# Patient Record
(demographics unavailable — no encounter records)

---

## 2024-10-24 NOTE — HISTORY OF PRESENT ILLNESS
[FreeTextEntry1] : I had the pleasure of seeing GENOVEVA in the cardiology office for follow-up. 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 The baby has been thriving at home, has been feeding without difficulty, and has been gaining weight and developing appropriately. There has been no tachypnea, increased work of breathing, cyanosis, diaphoresis, unexplained irritability, or syncope.

## 2024-10-24 NOTE — REVIEW OF SYSTEMS
[Nl] : no feeding issues at this time. [] :  [Acting Fussy] : not acting ~L fussy [Fever] : no fever [Wgt Loss (___ Lbs)] : no recent weight loss [Pallor] : not pale [Discharge] : no discharge [Redness] : no redness [Nasal Discharge] : no nasal discharge [Nasal Stuffiness] : no nasal congestion [Stridor] : no stridor [Cyanosis] : no cyanosis [Edema] : no edema [Diaphoresis] : not diaphoretic [Tachypnea] : not tachypneic [Wheezing] : no wheezing [Cough] : no cough [Being A Poor Eater] : not a poor eater [Vomiting] : no vomiting [Diarrhea] : no diarrhea [Decrease In Appetite] : appetite not decreased [Fainting (Syncope)] : no fainting [Dec Consciousness] :  no decrease in consciousness [Seizure] : no seizures [Hypotonicity (Flaccid)] : not hypotonic [Refusal to Bear Wgt] : normal weight bearing [Puffy Hands/Feet] : no hand/feet puffiness [Rash] : no rash [Hemangioma] : no hemangioma [Jaundice] : no jaundice [Wound problems] : no wound problems [Bruising] : no tendency for easy bruising [Swollen Glands] : no lymphadenopathy [Enlarged [place]] : the fontanelle was not enlarged [Hoarse Cry] : no hoarse cry [Failure To Thrive] : no failure to thrive [Vaginal Discharge] : no vaginal discharge [Ambiguous Genitals] : genitals not ambiguous [Dec Urine Output] : no oliguria [Solid Foods] : No solid food at this time [FreeTextEntry3] : on demand

## 2024-10-24 NOTE — CARDIOLOGY SUMMARY
[Today's Date] : [unfilled] [LVSF ___%] : LV Shortening Fraction [unfilled]% [FreeTextEntry1] : For Fetal follow up Normal sinus rhythm, normal QRS axis, normal intervals (QTc 447 msec), no hypertrophy, no pre-excitation, no ST segment or T wave abnormalities. Normal EKG for age.  [FreeTextEntry2] : PFO normal for age. Left PPS. 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 LV dimensions and shortening fraction were normal.  No pericardial effusion.

## 2024-10-24 NOTE — REVIEW OF SYSTEMS
[Nl] : no feeding issues at this time. [] :  [Acting Fussy] : not acting ~L fussy [Fever] : no fever [Wgt Loss (___ Lbs)] : no recent weight loss [Pallor] : not pale [Discharge] : no discharge [Redness] : no redness [Nasal Discharge] : no nasal discharge [Nasal Stuffiness] : no nasal congestion [Stridor] : no stridor [Cyanosis] : no cyanosis [Edema] : no edema [Diaphoresis] : not diaphoretic [Tachypnea] : not tachypneic [Wheezing] : no wheezing [Cough] : no cough [Being A Poor Eater] : not a poor eater [Vomiting] : no vomiting [Diarrhea] : no diarrhea [Decrease In Appetite] : appetite not decreased [Fainting (Syncope)] : no fainting [Dec Consciousness] :  no decrease in consciousness [Seizure] : no seizures [Hypotonicity (Flaccid)] : not hypotonic [Refusal to Bear Wgt] : normal weight bearing [Puffy Hands/Feet] : no hand/feet puffiness [Rash] : no rash [Hemangioma] : no hemangioma [Jaundice] : no jaundice [Wound problems] : no wound problems [Bruising] : no tendency for easy bruising [Swollen Glands] : no lymphadenopathy [Enlarged [place]] : the fontanelle was not enlarged [Hoarse Cry] : no hoarse cry [Failure To Thrive] : no failure to thrive [Vaginal Discharge] : no vaginal discharge [Ambiguous Genitals] : genitals not ambiguous [Dec Urine Output] : no oliguria [Solid Foods] : No solid food at this time [FreeTextEntry3] : on demand

## 2024-10-24 NOTE — CONSULT LETTER
[Today's Date] : [unfilled] [Name] : Name: [unfilled] [] : : ~~ [Today's Date:] : [unfilled] [Dear  ___:] : Dear Dr. [unfilled]: [Consult] : I had the pleasure of evaluating your patient, [unfilled]. My full evaluation follows. [Consult - Single Provider] : Thank you very much for allowing me to participate in the care of this patient. If you have any questions, please do not hesitate to contact me. [Sincerely,] : Sincerely, [FreeTextEntry4] : Sheng Luke MD [FreeTextEntry5] : [address] [FreeTextEntry6] : [place] [address] [de-identified] : Darvin Vitale MD, FACC, ULISSES, FAAP Pediatric Cardiologist [facility]

## 2024-10-24 NOTE — CONSULT LETTER
[Today's Date] : [unfilled] [Name] : Name: [unfilled] [] : : ~~ [Today's Date:] : [unfilled] [Dear  ___:] : Dear Dr. [unfilled]: [Consult] : I had the pleasure of evaluating your patient, [unfilled]. My full evaluation follows. [Consult - Single Provider] : Thank you very much for allowing me to participate in the care of this patient. If you have any questions, please do not hesitate to contact me. [Sincerely,] : Sincerely, [FreeTextEntry4] : Sheng Luke MD [FreeTextEntry5] : [address] [FreeTextEntry6] : [place] [address] [de-identified] : Darvin Vitale MD, FACC, ULISSES, FAAP Pediatric Cardiologist [facility]

## 2024-10-24 NOTE — PAST MEDICAL HISTORY
[At Term] : at term [Birth Weight:___] : [unfilled] weighed [unfilled] at birth. [ Section] : by  section [None] : No maternal complications [de-identified] : [place]

## 2024-10-24 NOTE — PAST MEDICAL HISTORY
[At Term] : at term [Birth Weight:___] : [unfilled] weighed [unfilled] at birth. [ Section] : by  section [None] : No maternal complications [de-identified] : [place]

## 2024-10-24 NOTE — DISCUSSION/SUMMARY
[FreeTextEntry1] : GENOVEVA is a 25 day female with mild left peripheral pulmonary stenosis. I discussed at length with the family that the murmur of PPS typically resolves around 6 months of age. I explained that it is hemodynamically insignificant and will cause no symptoms.   She has a patent foramen ovale, which is normal at this age and may close spontaneously. We discussed that 20% of individuals continue to have a PFO.  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  Follow up is in 3 months, earlier as clinically indicated. [Needs SBE Prophylaxis] : [unfilled] does not need bacterial endocarditis prophylaxis [May participate in all age-appropriate activities] : [unfilled] May participate in all age-appropriate activities. [Influenza vaccine is recommended] : Influenza vaccine is recommended

## 2024-10-24 NOTE — PHYSICAL EXAM
[General Appearance - Alert] : alert [General Appearance - In No Acute Distress] : in no acute distress [General Appearance - Well Nourished] : well nourished [General Appearance - Well Developed] : well developed [General Appearance - Well-Appearing] : well appearing [Appearance Of Head] : the head was normocephalic [Facies] : there were no dysmorphic facial features [Sclera] : the conjunctiva were normal [Outer Ear] : the ears and nose were normal in appearance [Examination Of The Oral Cavity] : mucous membranes were moist and pink [Auscultation Breath Sounds / Voice Sounds] : breath sounds clear to auscultation bilaterally [Normal Chest Appearance] : the chest was normal in appearance [Apical Impulse] : quiet precordium with normal apical impulse [Heart Rate And Rhythm] : normal heart rate and rhythm [Heart Sounds] : normal S1 and S2 [Heart Sounds Gallop] : no gallops [Heart Sounds Pericardial Friction Rub] : no pericardial rub [Edema] : no edema [Arterial Pulses] : normal upper and lower extremity pulses with no pulse delay [Heart Sounds Click] : no clicks [Capillary Refill Test] : normal capillary refill [Systolic] : systolic [II] : a grade 2/6 [LUSB] : LUSB [Ejection] : ejection [Low] : low pitched [Harsh] : harsh [Early] : early [Back] : the murmur was transmitted to the back [Bowel Sounds] : normal bowel sounds [Abdomen Soft] : soft [Nondistended] : nondistended [Abdomen Tenderness] : non-tender [Nail Clubbing] : no clubbing  or cyanosis of the fingers [Motor Tone] : normal muscle strength and tone [Cervical Lymph Nodes Enlarged Anterior] : The anterior cervical nodes were normal [Cervical Lymph Nodes Enlarged Posterior] : The posterior cervical nodes were normal [] : no rash [Skin Lesions] : no lesions [Skin Turgor] : normal turgor [Demonstrated Behavior - Infant Nonreactive To Parents] : interactive [Mood] : mood and affect were appropriate for age [Demonstrated Behavior] : normal behavior

## 2025-02-25 NOTE — CONSULT LETTER
[Today's Date] : [unfilled] [Name] : Name: [unfilled] [] : : ~~ [Today's Date:] : [unfilled] [Dear  ___:] : Dear Dr. [unfilled]: [Consult] : I had the pleasure of evaluating your patient, [unfilled]. My full evaluation follows. [Consult - Single Provider] : Thank you very much for allowing me to participate in the care of this patient. If you have any questions, please do not hesitate to contact me. [Sincerely,] : Sincerely, [FreeTextEntry4] : Sheng Luke MD [FreeTextEntry5] : [address] [FreeTextEntry6] : [place] [address] [de-identified] : Darvin Vitale MD, FACC, ULISSES, FAAP Pediatric Cardiologist [facility]

## 2025-02-25 NOTE — CARDIOLOGY SUMMARY
[Today's Date] : [unfilled] [LVSF ___%] : LV Shortening Fraction [unfilled]% [FreeTextEntry1] : For Fetal follow up Normal sinus rhythm, normal QRS axis, normal intervals (QTc 443 msec), no hypertrophy, no pre-excitation, no ST segment or T wave abnormalities. Normal EKG for age.  [FreeTextEntry2] : No PFO.  Normal flow in pulmonary arteries. No coronary artery fistula. LV dimensions and shortening fraction were normal.  No pericardial effusion.

## 2025-02-25 NOTE — PAST MEDICAL HISTORY
[At Term] : at term [Birth Weight:___] : [unfilled] weighed [unfilled] at birth. [ Section] : by  section [None] : No maternal complications [de-identified] : [place]

## 2025-02-25 NOTE — REVIEW OF SYSTEMS
[Nl] : no feeding issues at this time. [Breastmilk] : Breastmilk ~M [___ Formula] : [unfilled] Formula  [___ ounces/feeding] : ~CHON fitzpatrick/feeding [___ Times/day] : [unfilled] times/day [Acting Fussy] : not acting ~L fussy [Fever] : no fever [Wgt Loss (___ Lbs)] : no recent weight loss [Pallor] : not pale [Discharge] : no discharge [Redness] : no redness [Nasal Discharge] : no nasal discharge [Nasal Stuffiness] : no nasal congestion [Stridor] : no stridor [Cyanosis] : no cyanosis [Edema] : no edema [Diaphoresis] : not diaphoretic [Tachypnea] : not tachypneic [Wheezing] : no wheezing [Cough] : no cough [Being A Poor Eater] : not a poor eater [Vomiting] : no vomiting [Diarrhea] : no diarrhea [Decrease In Appetite] : appetite not decreased [Fainting (Syncope)] : no fainting [Dec Consciousness] :  no decrease in consciousness [Seizure] : no seizures [Hypotonicity (Flaccid)] : not hypotonic [Refusal to Bear Wgt] : normal weight bearing [Puffy Hands/Feet] : no hand/feet puffiness [Rash] : no rash [Hemangioma] : no hemangioma [Jaundice] : no jaundice [Wound problems] : no wound problems [Bruising] : no tendency for easy bruising [Swollen Glands] : no lymphadenopathy [Enlarged [place]] : the fontanelle was not enlarged [Hoarse Cry] : no hoarse cry [Failure To Thrive] : no failure to thrive [Vaginal Discharge] : no vaginal discharge [Ambiguous Genitals] : genitals not ambiguous [Dec Urine Output] : no oliguria [Solid Foods] : No solid food at this time

## 2025-02-25 NOTE — PHYSICAL EXAM
[General Appearance - Alert] : alert [General Appearance - In No Acute Distress] : in no acute distress [General Appearance - Well Developed] : well developed [General Appearance - Well-Appearing] : well appearing [Appearance Of Head] : the head was normocephalic [Facies] : there were no dysmorphic facial features [Sclera] : the conjunctiva were normal [Outer Ear] : the ears and nose were normal in appearance [Examination Of The Oral Cavity] : mucous membranes were moist and pink [Auscultation Breath Sounds / Voice Sounds] : breath sounds clear to auscultation bilaterally [Normal Chest Appearance] : the chest was normal in appearance [Apical Impulse] : quiet precordium with normal apical impulse [Heart Rate And Rhythm] : normal heart rate and rhythm [Heart Sounds] : normal S1 and S2 [Heart Sounds Gallop] : no gallops [Heart Sounds Pericardial Friction Rub] : no pericardial rub [Edema] : no edema [Arterial Pulses] : normal upper and lower extremity pulses with no pulse delay [Heart Sounds Click] : no clicks [Capillary Refill Test] : normal capillary refill [Systolic] : systolic [II] : a grade 2/6 [Low] : low pitched [Bowel Sounds] : normal bowel sounds [Abdomen Soft] : soft [Nondistended] : nondistended [Abdomen Tenderness] : non-tender [Nail Clubbing] : no clubbing  or cyanosis of the fingers [Motor Tone] : normal muscle strength and tone [Cervical Lymph Nodes Enlarged Anterior] : The anterior cervical nodes were normal [Cervical Lymph Nodes Enlarged Posterior] : The posterior cervical nodes were normal [] : no rash [Skin Lesions] : no lesions [Skin Turgor] : normal turgor [Demonstrated Behavior - Infant Nonreactive To Parents] : interactive [Mood] : mood and affect were appropriate for age [Demonstrated Behavior] : normal behavior [General Appearance - Well Nourished] : well nourished [LMSB] : LMSB  [Vibratory] : vibratory [Mid] : mid

## 2025-02-25 NOTE — HISTORY OF PRESENT ILLNESS
[FreeTextEntry1] : I had the pleasure of seeing GENOVEVA in the cardiology office for follow-up. 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 Her  evaluation revealed PFO, PPS and coronary artery fistula. She is being seen as a follow up visit. The baby has been thriving at home, has been feeding without difficulty, and has been gaining weight and developing appropriately. There has been no tachypnea, increased work of breathing, cyanosis, diaphoresis, unexplained irritability, or syncope. She is not vaccinated at all.

## 2025-02-25 NOTE — PAST MEDICAL HISTORY
[At Term] : at term [Birth Weight:___] : [unfilled] weighed [unfilled] at birth. [ Section] : by  section [None] : No maternal complications [de-identified] : [place]

## 2025-02-25 NOTE — REVIEW OF SYSTEMS
[Nl] : no feeding issues at this time. [Breastmilk] : Breastmilk ~M [___ Formula] : [unfilled] Formula  [___ ounces/feeding] : ~CHON fitzpatrick/feeding [___ Times/day] : [unfilled] times/day [Acting Fussy] : not acting ~L fussy [Fever] : no fever [Wgt Loss (___ Lbs)] : no recent weight loss [Pallor] : not pale [Discharge] : no discharge [Redness] : no redness [Nasal Discharge] : no nasal discharge [Nasal Stuffiness] : no nasal congestion [Stridor] : no stridor [Cyanosis] : no cyanosis [Edema] : no edema [Diaphoresis] : not diaphoretic [Tachypnea] : not tachypneic [Wheezing] : no wheezing [Cough] : no cough [Being A Poor Eater] : not a poor eater [Vomiting] : no vomiting [Diarrhea] : no diarrhea [Decrease In Appetite] : appetite not decreased [Fainting (Syncope)] : no fainting [Dec Consciousness] :  no decrease in consciousness [Seizure] : no seizures [Hypotonicity (Flaccid)] : not hypotonic [Refusal to Bear Wgt] : normal weight bearing [Puffy Hands/Feet] : no hand/feet puffiness [Rash] : no rash [Hemangioma] : no hemangioma [Jaundice] : no jaundice [Wound problems] : no wound problems [Bruising] : no tendency for easy bruising [Swollen Glands] : no lymphadenopathy [Enlarged [place]] : the fontanelle was not enlarged [Hoarse Cry] : no hoarse cry [Failure To Thrive] : no failure to thrive [Vaginal Discharge] : no vaginal discharge [Ambiguous Genitals] : genitals not ambiguous [Dec Urine Output] : no oliguria [Solid Foods] : No solid food at this time

## 2025-02-25 NOTE — CONSULT LETTER
[Today's Date] : [unfilled] [Name] : Name: [unfilled] [] : : ~~ [Today's Date:] : [unfilled] [Dear  ___:] : Dear Dr. [unfilled]: [Consult] : I had the pleasure of evaluating your patient, [unfilled]. My full evaluation follows. [Consult - Single Provider] : Thank you very much for allowing me to participate in the care of this patient. If you have any questions, please do not hesitate to contact me. [Sincerely,] : Sincerely, [FreeTextEntry4] : Sheng Luke MD [FreeTextEntry5] : [address] [FreeTextEntry6] : [place] [address] [de-identified] : Darvin Vitale MD, FACC, ULISSES, FAAP Pediatric Cardiologist [facility]

## 2025-02-25 NOTE — DISCUSSION/SUMMARY
[May participate in all age-appropriate activities] : [unfilled] May participate in all age-appropriate activities. [Influenza vaccine is recommended] : Influenza vaccine is recommended [FreeTextEntry1] : GENOVEVA is a 5-month female with mild left peripheral pulmonary stenosis. I discussed at length with the family that the murmur of PPS has resolved.  I explained that it is hemodynamically insignificant and will cause no symptoms.   She had a patent foramen ovale, which is also has close spontaneously.  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  Follow up is as clinically indicated. [Needs SBE Prophylaxis] : [unfilled] does not need bacterial endocarditis prophylaxis